# Patient Record
Sex: FEMALE | Race: BLACK OR AFRICAN AMERICAN | Employment: FULL TIME | URBAN - METROPOLITAN AREA
[De-identification: names, ages, dates, MRNs, and addresses within clinical notes are randomized per-mention and may not be internally consistent; named-entity substitution may affect disease eponyms.]

---
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
## 2019-02-21 ENCOUNTER — OFFICE VISIT - HEALTHEAST (OUTPATIENT)
Dept: FAMILY MEDICINE | Facility: CLINIC | Age: 19
End: 2019-02-21

## 2019-02-21 ENCOUNTER — COMMUNICATION - HEALTHEAST (OUTPATIENT)
Dept: FAMILY MEDICINE | Facility: CLINIC | Age: 19
End: 2019-02-21

## 2019-02-21 DIAGNOSIS — B96.89 BV (BACTERIAL VAGINOSIS): ICD-10-CM

## 2019-02-21 DIAGNOSIS — N89.8 VAGINAL ITCHING: ICD-10-CM

## 2019-02-21 DIAGNOSIS — N76.0 BV (BACTERIAL VAGINOSIS): ICD-10-CM

## 2019-02-21 DIAGNOSIS — B35.4 TINEA CORPORIS: ICD-10-CM

## 2019-02-21 DIAGNOSIS — R79.89 LOW VITAMIN D LEVEL: ICD-10-CM

## 2019-02-21 DIAGNOSIS — Z11.3 SCREENING FOR STD (SEXUALLY TRANSMITTED DISEASE): ICD-10-CM

## 2019-02-21 DIAGNOSIS — Z00.00 WELL FEMALE EXAM WITHOUT GYNECOLOGICAL EXAM: ICD-10-CM

## 2019-02-21 LAB
ALBUMIN SERPL-MCNC: 4.3 G/DL (ref 3.5–5)
ALP SERPL-CCNC: 61 U/L (ref 45–120)
ALT SERPL W P-5'-P-CCNC: 27 U/L (ref 0–45)
ANION GAP SERPL CALCULATED.3IONS-SCNC: 8 MMOL/L (ref 5–18)
AST SERPL W P-5'-P-CCNC: 27 U/L (ref 0–40)
BILIRUB SERPL-MCNC: 0.7 MG/DL (ref 0–1)
BUN SERPL-MCNC: 10 MG/DL (ref 8–22)
CALCIUM SERPL-MCNC: 10 MG/DL (ref 8.5–10.5)
CHLORIDE BLD-SCNC: 103 MMOL/L (ref 98–107)
CHOLEST SERPL-MCNC: 168 MG/DL
CLUE CELLS: ABNORMAL
CO2 SERPL-SCNC: 27 MMOL/L (ref 22–31)
CREAT SERPL-MCNC: 0.82 MG/DL (ref 0.6–1.1)
ERYTHROCYTE [DISTWIDTH] IN BLOOD BY AUTOMATED COUNT: 11.5 % (ref 11–14.5)
FASTING STATUS PATIENT QL REPORTED: NO
GFR SERPL CREATININE-BSD FRML MDRD: >60 ML/MIN/1.73M2
GLUCOSE BLD-MCNC: 75 MG/DL (ref 70–125)
HCT VFR BLD AUTO: 44.3 % (ref 35–47)
HDLC SERPL-MCNC: 58 MG/DL
HGB BLD-MCNC: 15.1 G/DL (ref 12–16)
HIV 1+2 AB+HIV1 P24 AG SERPL QL IA: NEGATIVE
LDLC SERPL CALC-MCNC: 96 MG/DL
MCH RBC QN AUTO: 30.2 PG (ref 27–34)
MCHC RBC AUTO-ENTMCNC: 34 G/DL (ref 32–36)
MCV RBC AUTO: 89 FL (ref 80–100)
PLATELET # BLD AUTO: 268 THOU/UL (ref 140–440)
PMV BLD AUTO: 7.4 FL (ref 7–10)
POTASSIUM BLD-SCNC: 4.1 MMOL/L (ref 3.5–5)
PROT SERPL-MCNC: 7 G/DL (ref 6–8)
RBC # BLD AUTO: 5 MILL/UL (ref 3.8–5.4)
SODIUM SERPL-SCNC: 138 MMOL/L (ref 136–145)
TRICHOMONAS, WET PREP: ABNORMAL
TRIGL SERPL-MCNC: 69 MG/DL
TSH SERPL DL<=0.005 MIU/L-ACNC: 0.55 UIU/ML (ref 0.3–5)
WBC: 4.9 THOU/UL (ref 4–11)
YEAST, WET PREP: ABNORMAL

## 2019-02-21 ASSESSMENT — MIFFLIN-ST. JEOR: SCORE: 1654.36

## 2019-02-22 ENCOUNTER — COMMUNICATION - HEALTHEAST (OUTPATIENT)
Dept: INTERNAL MEDICINE | Facility: CLINIC | Age: 19
End: 2019-02-22

## 2019-02-22 ENCOUNTER — COMMUNICATION - HEALTHEAST (OUTPATIENT)
Dept: FAMILY MEDICINE | Facility: CLINIC | Age: 19
End: 2019-02-22

## 2019-02-22 LAB
25(OH)D3 SERPL-MCNC: 10.4 NG/ML (ref 30–80)
C TRACH DNA SPEC QL PROBE+SIG AMP: NEGATIVE
HSV1 IGG SERPL QL IA: POSITIVE
HSV2 IGG SERPL QL IA: POSITIVE
N GONORRHOEA DNA SPEC QL NAA+PROBE: NEGATIVE
T PALLIDUM AB SER QL: NEGATIVE

## 2019-02-26 ENCOUNTER — COMMUNICATION - HEALTHEAST (OUTPATIENT)
Dept: SCHEDULING | Facility: CLINIC | Age: 19
End: 2019-02-26

## 2019-02-27 ENCOUNTER — COMMUNICATION - HEALTHEAST (OUTPATIENT)
Dept: FAMILY MEDICINE | Facility: CLINIC | Age: 19
End: 2019-02-27

## 2019-10-16 ENCOUNTER — OFFICE VISIT - HEALTHEAST (OUTPATIENT)
Dept: FAMILY MEDICINE | Facility: CLINIC | Age: 19
End: 2019-10-16

## 2019-10-16 DIAGNOSIS — R07.0 THROAT PAIN: ICD-10-CM

## 2019-10-16 DIAGNOSIS — Z11.3 SCREEN FOR STD (SEXUALLY TRANSMITTED DISEASE): ICD-10-CM

## 2019-10-16 LAB — DEPRECATED S PYO AG THROAT QL EIA: NORMAL

## 2019-10-17 ENCOUNTER — AMBULATORY - HEALTHEAST (OUTPATIENT)
Dept: FAMILY MEDICINE | Facility: CLINIC | Age: 19
End: 2019-10-17

## 2019-10-17 DIAGNOSIS — A54.9 GONORRHEA: ICD-10-CM

## 2019-10-17 LAB
C TRACH DNA SPEC QL PROBE+SIG AMP: NEGATIVE
GROUP A STREP BY PCR: NORMAL
N GONORRHOEA DNA SPEC QL NAA+PROBE: POSITIVE

## 2019-10-18 LAB — BACTERIA SPEC CULT: NORMAL

## 2020-09-25 ENCOUNTER — COMMUNICATION - HEALTHEAST (OUTPATIENT)
Dept: SCHEDULING | Facility: CLINIC | Age: 20
End: 2020-09-25

## 2020-09-26 ENCOUNTER — OFFICE VISIT (OUTPATIENT)
Dept: URGENT CARE | Facility: URGENT CARE | Age: 20
End: 2020-09-26
Payer: COMMERCIAL

## 2020-09-26 VITALS
SYSTOLIC BLOOD PRESSURE: 128 MMHG | OXYGEN SATURATION: 100 % | TEMPERATURE: 98 F | DIASTOLIC BLOOD PRESSURE: 84 MMHG | WEIGHT: 175 LBS | HEART RATE: 61 BPM

## 2020-09-26 DIAGNOSIS — T78.40XA ALLERGIC REACTION, INITIAL ENCOUNTER: Primary | ICD-10-CM

## 2020-09-26 PROCEDURE — 99203 OFFICE O/P NEW LOW 30 MIN: CPT | Performed by: NURSE PRACTITIONER

## 2020-09-26 RX ORDER — CETIRIZINE HYDROCHLORIDE 10 MG/1
10 TABLET ORAL DAILY
Qty: 30 TABLET | Refills: 0 | Status: SHIPPED | OUTPATIENT
Start: 2020-09-26 | End: 2020-10-26

## 2020-09-26 RX ORDER — PREDNISONE 20 MG/1
40 TABLET ORAL DAILY
Qty: 10 TABLET | Refills: 0 | Status: SHIPPED | OUTPATIENT
Start: 2020-09-26 | End: 2020-10-01

## 2020-09-26 RX ORDER — ACYCLOVIR 200 MG/1
200 CAPSULE ORAL
COMMUNITY

## 2020-09-26 RX ORDER — DIPHENHYDRAMINE HCL 25 MG
25 CAPSULE ORAL EVERY 6 HOURS PRN
COMMUNITY

## 2020-09-26 NOTE — PATIENT INSTRUCTIONS
Patient Education     Understanding Hives (Urticaria)    Urticaria (hives) are red, itchy, and swollen areas on the skin. They are most often an allergic reaction from eating a food or taking a medicine. Sometimes the cause may be unknown. A single hive can vary in size from a half inch to several inches. Hives can appear all over the body. Or they may appear on only one part of the body.  What causes hives?  Hives can be caused by food and beverages such as:    Tree nuts (almonds, walnuts, hazelnuts)    Peanuts    Eggs    Shellfish    Milk  Hives can also be caused by medicines such as:    Antibiotics, especially penicillin and sulfa-based medicines     Anticonvulsant or antiseizure medicines     Chemotherapy medicines   Other causes of hives include:    Infection or virus    Heat    Cold air or cold water    Exercise    Scratching or rubbing your skin, or wearing tight-fitting clothes that rub your skin    Being exposed to sunlight or light from a light bulb, in rare cases    Inhaled-chemicals in the environment from foods and drugs, insects, plants, or other sources  In some cases, hives may occur again and again with no specific cause.  If you have hives    Stay away from the food, drink, medicine, or other thing that may be causing the hives.    Ask your healthcare provider how to control itchy or irritated skin.    Talk with your healthcare provider right away if you think a medicine gave you hives.  Watch for anaphylaxis  If you have hives, watch for symptoms of a severe reaction that can affect your entire body. This is called anaphylaxis. Symptoms can include swollen areas of the body, wheezing, trouble breathing or swallowing, and a hoarse voice. This reaction may happen right away. Or it may happen in an hour or more. In extreme cases, the airways from mouth to lungs may swell and make breathing difficult. This is a medical emergency. Use epinephrine medicine if you have it, and call 911 or go to the  emergency room.     When to call your healthcare provider  Call your healthcare provider if:    Your hives feel uncomfortable    You have never had hives before    Your symptoms don't go away or come back    Your symptoms get worse or new symptoms develop such as:   ? Sneezing, coughing, runny or stuffy nose  ? Itching of the eyes, nose, or roof of the mouth  ? Itching, burning, stinging, or pain  ? Dry, flaky, cracking, or scaly skin  ? Red or purple spots  Call 911  Call 911 right away if you have:    Swelling in your lips, tongue, or throat, called angioedema    Drooling    Trouble breathing, talking, or swallowing    Cool, moist or pale (blue in color) skin    Fast and weak heartbeat    Wheezing or short of breath    Feeling lightheaded or confused    Diarrhea    Severe nausea or vomiting    Seizure    Feeling dizzy or weak, or a sudden drop in blood pressure  Date Last Reviewed: 4/1/2017 2000-2019 [employer]. [address] [place] [address], [place] [address]. All rights reserved. This information is not intended as a substitute for professional medical care. Always follow your healthcare professional's instructions.

## 2020-09-26 NOTE — PROGRESS NOTES
SUBJECTIVE:   Neto Reyes is a 19 year old female presenting with a chief complaint of hives intermittently for 3 days. She ate pecans 24 hours before the outbreak.    Onset of symptoms was 3 day(s) ago.  Course of illness is same    Severity moderate  Previous Treatment benadryl      History reviewed. No pertinent past medical history.  Current Outpatient Medications   Medication Sig Dispense Refill     acyclovir (ZOVIRAX) 200 MG capsule Take 200 mg by mouth 5 times daily       cetirizine (ZYRTEC) 10 MG tablet Take 1 tablet (10 mg) by mouth daily 30 tablet 0     diphenhydrAMINE (BENADRYL) 25 MG capsule Take 25 mg by mouth every 6 hours as needed for itching or allergies       predniSONE (DELTASONE) 20 MG tablet Take 2 tablets (40 mg) by mouth daily for 5 days 10 tablet 0     Social History     Tobacco Use     Smoking status: Never Smoker     Smokeless tobacco: Never Used   Substance Use Topics     Alcohol use: Not on file     History reviewed. No pertinent family history.     ROS: 10 point ROS neg other than the symptoms noted above in the HPI.     OBJECTIVE  /84   Pulse 61   Temp 98  F (36.7  C) (Tympanic)   Wt 79.4 kg (175 lb)   SpO2 100%       GENERAL APPEARANCE: healthy appearing, alert     EYES: PERRL, EOMI, sclera non-icteric     HENT: oral exam benign, mucus membranes intact, without ulcers or lesions     NECK: no adenopathy or asymmetry, thyroid normal to palpation     RESP: lungs clear to auscultation - no rales, rhonchi or wheezes     CV: regular rates and rhythm, no murmurs, rubs, or gallop     SKIN: hives scattered all over body      ASSESSMENT/PLAN:      ICD-10-CM    1. Allergic reaction, initial encounter  T78.40XA predniSONE (DELTASONE) 20 MG tablet     cetirizine (ZYRTEC) 10 MG tablet        Follow Up:      Patient Instructions     Patient Education     Understanding Hives (Urticaria)    Urticaria (hives) are red, itchy, and swollen areas on the skin. They are most often an allergic  reaction from eating a food or taking a medicine. Sometimes the cause may be unknown. A single hive can vary in size from a half inch to several inches. Hives can appear all over the body. Or they may appear on only one part of the body.  What causes hives?  Hives can be caused by food and beverages such as:    Tree nuts (almonds, walnuts, hazelnuts)    Peanuts    Eggs    Shellfish    Milk  Hives can also be caused by medicines such as:    Antibiotics, especially penicillin and sulfa-based medicines     Anticonvulsant or antiseizure medicines     Chemotherapy medicines   Other causes of hives include:    Infection or virus    Heat    Cold air or cold water    Exercise    Scratching or rubbing your skin, or wearing tight-fitting clothes that rub your skin    Being exposed to sunlight or light from a light bulb, in rare cases    Inhaled-chemicals in the environment from foods and drugs, insects, plants, or other sources  In some cases, hives may occur again and again with no specific cause.  If you have hives    Stay away from the food, drink, medicine, or other thing that may be causing the hives.    Ask your healthcare provider how to control itchy or irritated skin.    Talk with your healthcare provider right away if you think a medicine gave you hives.  Watch for anaphylaxis  If you have hives, watch for symptoms of a severe reaction that can affect your entire body. This is called anaphylaxis. Symptoms can include swollen areas of the body, wheezing, trouble breathing or swallowing, and a hoarse voice. This reaction may happen right away. Or it may happen in an hour or more. In extreme cases, the airways from mouth to lungs may swell and make breathing difficult. This is a medical emergency. Use epinephrine medicine if you have it, and call 911 or go to the emergency room.     When to call your healthcare provider  Call your healthcare provider if:    Your hives feel uncomfortable    You have never had hives  before    Your symptoms don't go away or come back    Your symptoms get worse or new symptoms develop such as:   ? Sneezing, coughing, runny or stuffy nose  ? Itching of the eyes, nose, or roof of the mouth  ? Itching, burning, stinging, or pain  ? Dry, flaky, cracking, or scaly skin  ? Red or purple spots  Call 911  Call 911 right away if you have:    Swelling in your lips, tongue, or throat, called angioedema    Drooling    Trouble breathing, talking, or swallowing    Cool, moist or pale (blue in color) skin    Fast and weak heartbeat    Wheezing or short of breath    Feeling lightheaded or confused    Diarrhea    Severe nausea or vomiting    Seizure    Feeling dizzy or weak, or a sudden drop in blood pressure  Date Last Reviewed: 4/1/2017 2000-2019 [employer]. [address] [place] [address]. All rights reserved. This information is not intended as a substitute for professional medical care. Always follow your healthcare professional's instructions.               ADAMS Mercado, CNP  [facility] Provider

## 2021-06-02 VITALS — HEIGHT: 63 IN | BODY MASS INDEX: 35.79 KG/M2 | WEIGHT: 202 LBS

## 2021-06-03 VITALS
SYSTOLIC BLOOD PRESSURE: 123 MMHG | BODY MASS INDEX: 31.3 KG/M2 | DIASTOLIC BLOOD PRESSURE: 75 MMHG | RESPIRATION RATE: 12 BRPM | HEART RATE: 70 BPM | OXYGEN SATURATION: 100 % | TEMPERATURE: 98 F | WEIGHT: 178.1 LBS

## 2021-06-11 NOTE — TELEPHONE ENCOUNTER
Triage call:   Couple days ago had breakfast had pecans- states she has not had issues with Pecans in the past- about 24 hours later she started to get hives- took benadryl - has been consistently having to take benadryl over the last 48 hours  Widespread hives- very itchy   no trouble breathing or scratchy throat    COVID 19 Nurse Triage Plan/Patient Instructions    Please be aware that novel coronavirus (COVID-19) may be circulating in the community. If you develop symptoms such as fever, cough, or SOB or if you have concerns about the presence of another infection including coronavirus (COVID-19), please contact your health care provider or visit [email or web address].     Disposition/Instructions    In-Person Visit with provider recommended. Reference Visit Selection Guide. Will go to [facility] today- states she lives in [place] now.     Thank you for taking steps to prevent the spread of this virus.  o Limit your contact with others.  o Wear a simple mask to cover your cough.  o Wash your hands well and often.    Resources    [facility]: About COVID-19: [email or web address]    CDC: What to Do If You're Sick: www.cdc.gov/coronavirus/2019-ncov/about/steps-when-sick.html    CDC: Ending Home Isolation: www.cdc.gov/coronavirus/2019-ncov/hcp/disposition-in-home-patients.html     CDC: Caring for Someone: www.cdc.gov/coronavirus/2019-ncov/if-you-are-sick/care-for-someone.html     [facility]: Interim Guidance for Hospital Discharge to Home: www.health.[facility].[place].us/diseases/coronavirus/hcp/hospdischarge.pdf    [facility] clinical trials (COVID-19 research studies): clinicalaffairs.[facility]/um-clinical-trials     Below are the COVID-19 hotlines at the [place] Department of [facility] ([facility]). Interpreters are available.   o For health questions: Call 698-145-5679 or 1-398.863.8535 (7 a.m. to 7 p.m.)  o For questions about schools and childcare: Call 310-939-9434 or 1-143.885.2962 (7 a.m.  to 7 p.m.)     Catie Harrison RN BSBA [facility] Triage/Med Refill 9/25/2020 1:48 PM  Reason for Disposition    MODERATE-SEVERE hives persist (i.e., hives interfere with normal activities or work) and taking antihistamine (e.g., Benadryl, Claritin) > 24 hours    Additional Information    Negative: Difficulty breathing or wheezing now    Negative: Rapid onset of swollen tongue    Negative: Rapid onset of hoarseness or cough    Negative: Very weak (can't stand)    Negative: Difficult to awaken or acting confused (e.g., disoriented, slurred speech)    Negative: Life-threatening reaction (anaphylaxis) in the past to similar substance (e.g., food, insect bite/sting, chemical, etc.) and < 2 hours since exposure    Negative: Sounds like a life-threatening emergency to the triager    Negative: Bee, wasp, or yellow jacket sting within last 24 hours    Negative: Taking a new medicine now or within last 3 days (EXCEPTION: antihistamine, decongestant or other OTC cough/cold medicines)    Negative: Doesn't match the SYMPTOMS of hives    Negative: Swollen tongue    Negative: Widespread hives and onset < 2 hours of exposure to high-risk allergen (e.g., peanuts, tree nuts, fish, or shellfish)    Negative: Patient sounds very sick or weak to the triager    Protocols used: HIVES-A-OH

## 2021-06-17 NOTE — PATIENT INSTRUCTIONS - HE
Patient Instructions by Paul Quiroz PA-C at 10/16/2019  7:10 AM     Author: Paul Quiroz PA-C Service: -- Author Type: Physician Assistant    Filed: 10/16/2019  7:57 AM Encounter Date: 10/16/2019 Status: Signed    : Paul Quiroz PA-C (Physician Assistant)       Rapid Strep test was negative.     If overnight test returns positive, we will call tomorrow and call in antibiotic prescription.    No notification means that overnight test returned negative.    Symptoms are likely due to viral infection that will resolve on its own in 1-2 weeks.    May continue with symptomatic treatments including:  - Salt water gargles  - Warm beverages such as a non-caffeinated tea with honey  - Throat drops  - Ibuprofen or acetaminophen for pain or fever relief    If fevers not coming down with acetaminophen or ibuprofen, shortness of breath, not tolerating oral liquid intake, drooling, or stiff neck, return for re-evaluation immediately. Otherwise, if no improvement in the next week, follow up with your primary care provider.    Patient Education     If You Think You Have an STI (STD)  Treating a sexually transmitted infection (STI),  early limits the problems they can cause and helps prevent its spread to others. An STI is also known as a sexually transmitted disease (STD). If you have an STI, get treated right away. Ask your partner to be tested, too. Then avoid sex until youve finished treatment and your healthcare provider says its OK to have sex again.  Follow your treatment plan  Treatment depends on the type of STI you have. Common treatments include injections and oral pills or liquids. Creams and gels can be applied to sores or warts caused by certain STIs. Follow the tips below:    Get new treatment for each new STI.    Dont use old medicine, even for the same STI. Use medicines as directed.    Dont share medicine unless instructed to do so by your healthcare provider or clinic.  Talk to your  partner  If you have an STI, its your duty to tell all your recent partners so they can be tested and treated. This is one important way to prevent the disease from being spread. Telling a partner that you have an STI can be hard. You may be embarrassed, angry, or afraid. Its often unclear who had the STI first. So try not to place blame. Your healthcare provider may offer some advice on how to start.  Prevent future problems  Even after youve been treated, you can still be infected again. This is a common problem. It can happen if a partner passes the STI back to you. To prevent this, your partner or partners must be tested. He or she may also need treatment. After treatment, go to any scheduled follow-up visits. Then prevent future problems with safer sex. Limit your number of partners and always use a latex condom.  Diagnosing STIs  Your healthcare provider will take a health history and examine you. During your health history, you will be asked about your sex habits and health history. You may also be asked about drug use. Give honest answers. Your healthcare provider will then check your body for signs of STIs. He or she also may do one or more of the following tests:    Fluid may be swabbed from sores. Samples also may be taken from the vagina, penis, mouth, or rectum. The samples are then tested for STIs.    Blood or urine samples may be taken. They are checked for viruses or bacteria that cause STIs.    For women, cells from the cervix are checked for signs of cancer and the genital wart virus (human papillomavirus, or HPV). This is called a Pap smear, and is often now done along with HPV testing. If cell changes are found, or a high-risk type of HPV is found, a magnifying scope may be used to take a closer look (colposcopy). In men and women, a Pap smear may be done on the anus to check for HPV-linked cancer or precancerous changes. This is done by a healthcare provider gently swabbing cells from the lining  of the anus, and then sending the sample to be looked at in the lab. If there are signs of abnormality, you may need more testing.  Date Last Reviewed: 2/1/2019 2000-2019 [employer]. [address] [place] [address], [place] [address]. All rights reserved. This information is not intended as a substitute for professional medical care. Always follow your healthcare professional's instructions.

## 2021-06-18 NOTE — LETTER
Letter by Kriss Melo PA-C at      Author: Kriss Melo PA-C Service: -- Author Type: --    Filed:  Encounter Date: 2/22/2019 Status: (Other)       [facility]  [address]  [place] [address]             February 22, 2019       Dear MsShireen MaderaReyes,    Below are the results from your recent visit:    Order was sent to  Pharmacy.  Positive for bacterial vaginosis, not a STD, nightly treatment for 5 nights and should resolve.     Resulted Orders   Wet Prep, Vaginal   Result Value Ref Range    Yeast Result No yeast seen No yeast seen    Trichomonas No Trichomonas seen No Trichomonas seen    Clue Cells, Wet Prep Clue cells seen (!) No Clue cells seen   HM2(CBC w/o Differential)   Result Value Ref Range    WBC 4.9 4.0 - 11.0 thou/uL    RBC 5.00 3.80 - 5.40 mill/uL    Hemoglobin 15.1 12.0 - 16.0 g/dL    Hematocrit 44.3 35.0 - 47.0 %    MCV 89 80 - 100 fL    MCH 30.2 27.0 - 34.0 pg    MCHC 34.0 32.0 - 36.0 g/dL    RDW 11.5 11.0 - 14.5 %    Platelets 268 140 - 440 thou/uL    MPV 7.4 7.0 - 10.0 fL         Please call with questions or contact us using [employer]    Sincerely,        Electronically signed by Kriss Melo PA-C

## 2021-06-24 NOTE — PROGRESS NOTES
SUBJECTIVE:  Kimi Reyes is a 18 y.o. female who is here for an annual physical.  Kimi Reyes is seen for her yearly physical, also is concerned about getting full STI testing.  She notes some increased vaginal secretions lately with no pelvic pain, dyspareunia, dysuria, vaginal rashes.  She does have some itching.  She has one sexual partner, they both did STI testing at the beginning of the relationship.  They are monogamous.  She also has a history of eczema, uses Hydroucerin cream which usually takes care of her eczema well.  She notes now that she has several little round patches in various areas on her skin, right forearm extensor surface, right abdomen area, left neck area, left inner thigh area.  All of these patches are less than 1-1-1/2 cm in diameter, flat, dry, hyperpigmented.  ROS: Patient denies fever, chills, sweats, fainting, fatigue, weight change, dizziness, sleep problems, chest pain, palpitations, shortness of breath, wheezing, cough,  sore throat, changes in hearing, ear pain,tinnitus,  disphagia, sore throat, globus, changes in vision, eye pain eye redness, acid reflux, nausea, vomiting, diarrhea, constipation, black or bloody stools,  Dysuria, frequency, urinary incontinence, nocturia, hematuria, back pain,joint pain, bone pain, muscle cramps,edema, weakness, numbness, tingling of extremities, rash, itching, skin changes, swollen lymph nodes, thirst, increased urination, breast lumps, breast pain, nipple discharge, memory difficulties, anxiety, mood swings, (female)vaginal discharge, dyspareunia, menorrhagia, pelvic pain, sexual dysfunction,   (male) testicular lumps, erectile dysfunction.    No past medical history on file.  Current Outpatient Medications on File Prior to Visit   Medication Sig Dispense Refill     etonogestrel (NEXPLANON) 68 mg Impl implant 1 each by Subdermal route once.       No current facility-administered medications on file prior to visit.   "    Social History     Socioeconomic History     Marital status: Single     Spouse name: Not on file     Number of children: Not on file     Years of education: Not on file     Highest education level: Not on file   Occupational History     Not on file   Social Needs     Financial resource strain: Not on file     Food insecurity:     Worry: Not on file     Inability: Not on file     Transportation needs:     Medical: Not on file     Non-medical: Not on file   Tobacco Use     Smoking status: Never Smoker     Smokeless tobacco: Never Used   Substance and Sexual Activity     Alcohol use: No     Frequency: Never     Drug use: No     Sexual activity: Yes     Birth control/protection: Implant   Lifestyle     Physical activity:     Days per week: Not on file     Minutes per session: Not on file     Stress: Not on file   Relationships     Social connections:     Talks on phone: Not on file     Gets together: Not on file     Attends Synagogue service: Not on file     Active member of club or organization: Not on file     Attends meetings of clubs or organizations: Not on file     Relationship status: Not on file     Intimate partner violence:     Fear of current or ex partner: Not on file     Emotionally abused: Not on file     Physically abused: Not on file     Forced sexual activity: Not on file   Other Topics Concern     Not on file   Social History Narrative     Not on file     Social History     Social History Narrative     Not on file     No past surgical history on file.  No family history on file.  /74   Pulse 72   Ht 5' 3.25\" (1.607 m)   Wt 202 lb (91.6 kg)   LMP  (LMP Unknown)   SpO2 99%   BMI 35.50 kg/m    Objective:  General: alert, oriented and in no distress.  HEENT: Sclera white, PERRLA, nares patent, MMM.  Heart: Heart has a regular rate and rhythm, no murmurs, clicks or rubs.  Lungs: Respirations are not labored. Equal chest rise, clear to auscultation, without rales, rhonchi or " wheezes.  Abdomen: BS in 4 quadrants, no tenderness to light or deep palpation, liver and spleen are not palpably enlarged and there are no masses noted.   Back: Normal curvatures, no tenderness of spinous processes or musculature with palpation of cervical, thoracic or lumbar back. No CVA tenderness bilaterally.  : normal appearing female with no external labial rash, no vaginal rash, moderate white exudates and normal appearing cervix. No tenderness to bimanual exam and no palpable masses.  Neuro: CN 2-12 intact, equal M/S, normal gait.  Skin: 1-1-1/2 cm dry patches of pigmented skin on right extensor forearm, right anterior chest, left inner thigh.  No erythema, no raised edge, no pustules or papules.  Mood: good eye contact, affect full, no pressured speech, no suicidal ideations.  Breasts: Normal appearing, no nipple discharge or skin changes bilaterally, no palpable masses or tenderness, no axillary adenopathy bilaterally.    Assessment and Plan:  1. Vaginal itching  Wet Prep, Vaginal   2. Screening for STD (sexually transmitted disease)  HIV Antigen/Antibody Screening [place]    Herpes simplex Virus (Type 1 and 2) IgG Antibody    Syphilis Screen, [facility]    Chlamydia trachomatis & Neisseria gonorrhoeae, Amplified Detection    HIV Antigen/Antibody Screening [place]   3. Well female exam without gynecological exam  HM2(CBC w/o Differential)    Lipid [facility]    Thyroid [place]    Comprehensive Metabolic Panel    Vitamin D, Total (25-Hydroxy)    HM2(CBC w/o Differential)    Lipid [place]    Thyroid [place]    Comprehensive Metabolic Panel    Vitamin D, Total (25-Hydroxy)   4. Tinea corporis  terbinafine HCl (LAMISIL) 1 % cream   5. BV (bacterial vaginosis)  metroNIDAZOLE (METROGEL VAGINAL) 0.75 % vaginal gel       Advised health diet, 6-8 fruit and vegetables daily and discussed juicing in am with protein powder for breakfast.  Advised regular exercise, discussed regular aerobic exercise and strength  training.  Advised not more than 1 alcoholic drinks in any given day.  Caffeine: not more than 2 daily.  Water: 6-8 glasses daily.

## 2021-06-24 NOTE — TELEPHONE ENCOUNTER
Left message to call back. Result was later than others and yes it is positive. Will discuss the results at phone conversation.

## 2021-06-24 NOTE — PATIENT INSTRUCTIONS - HE
Advised health diet, 6-8 fruit and vegetables daily and discussed juicing in am with protein powder for breakfast.  Advised regular exercise, discussed regular aerobic exercise and strength training.  Advised not more than 1 alcoholic drinks in any given day.  Caffeine: not more than 2 daily.  Water: 6-8 glasses daily.  Multivitamin with iron daily

## 2021-06-24 NOTE — TELEPHONE ENCOUNTER
Test result questions.     What does the positive Herpes test mean?  -Education for STI discussed with patient    What prescriptions should she be taking?  -ergocalciferol for Vit D  -metronidazole vaginal gel  -terbinafine cream  --All discussed with patient.     When should she follow up?  -did not see documentation of recommended follow up  -provider please advise.     Reason for Disposition    [1] Follow-up call from patient regarding patient's clinical status AND [2] information NON-URGENT    Protocols used: PCP CALL - NO TRIAGE-A-    Norah Bruno RN Triage Nurse Advisor [facility]

## 2021-06-24 NOTE — TELEPHONE ENCOUNTER
Patient Returning Call  Reason for call:  Patient returned call   Information relayed to patient:    Patient given the information as instructed Notes recorded by Kriss Melo PA-C on 2/21/2019 at 1:30 PM CST  Called to patient and order was sent to her pharmacy, had to leave a message so she may call for results; positive for bacterial vaginosis, not a STD, nightly treatment for 5 nights and should resolve.  Patient has additional questions:  No  If YES, what are your questions/concerns:  none  Okay to leave a detailed message?: Yes

## 2021-06-28 NOTE — PROGRESS NOTES
Progress Notes by Paul Quiroz PA-C at 10/16/2019  7:10 AM     Author: Paul Quiroz PA-C Service: -- Author Type: Physician Assistant    Filed: 10/16/2019  8:28 AM Encounter Date: 10/16/2019 Status: Signed    : Paul Quiroz PA-C (Physician Assistant)         Assessment:       1. Throat pain  Rapid Strep A Screen-Throat swab    Group A Strep, RNA Direct Detection, Throat   2. Screen for STD (sexually transmitted disease)  Chlamydia trachomatis & Neisseria gonorrhoeae, Amplified Detection    Culture, Throat     Medical Decision Making  Patient presents with throat pain most likely a viral URI, but may also be a gonorrhea pharyngitis patiently recently had oral sex exposure with a new contact.  Will obtain a throat culture at this time.  The rapid strep test today is negative.  Also obtained the dirty urine sample to test for gonorrhea and chlamydia.  Patient will continue to wait until her other STD screening tests come back from [facility].    25 minutes spent with the patient with greater than 50% of face-to-face time spent counseling and/or coordination of care.      Plan:       Over-the-counter analgesics discussed.  Salt water gargles, throat lozenges, warm tea with honey as needed.  Abstain from sex until STD screening tests have returned.  Discussed wearing protection.  Discussed signs of worsening symptoms and when to follow-up with PCP if no symptom improvement.      Patient Instructions   Rapid Strep test was negative.     If overnight test returns positive, we will call tomorrow and call in antibiotic prescription.    No notification means that overnight test returned negative.    Symptoms are likely due to viral infection that will resolve on its own in 1-2 weeks.    May continue with symptomatic treatments including:  - Salt water gargles  - Warm beverages such as a non-caffeinated tea with honey  - Throat drops  - Ibuprofen or acetaminophen for pain or fever  relief    If fevers not coming down with acetaminophen or ibuprofen, shortness of breath, not tolerating oral liquid intake, drooling, or stiff neck, return for re-evaluation immediately. Otherwise, if no improvement in the next week, follow up with your primary care provider.    Patient Education     If You Think You Have an STI (STD)  Treating a sexually transmitted infection (STI),  early limits the problems they can cause and helps prevent its spread to others. An STI is also known as a sexually transmitted disease (STD). If you have an STI, get treated right away. Ask your partner to be tested, too. Then avoid sex until youve finished treatment and your healthcare provider says its OK to have sex again.  Follow your treatment plan  Treatment depends on the type of STI you have. Common treatments include injections and oral pills or liquids. Creams and gels can be applied to sores or warts caused by certain STIs. Follow the tips below:    Get new treatment for each new STI.    Dont use old medicine, even for the same STI. Use medicines as directed.    Dont share medicine unless instructed to do so by your healthcare provider or clinic.  Talk to your partner  If you have an STI, its your duty to tell all your recent partners so they can be tested and treated. This is one important way to prevent the disease from being spread. Telling a partner that you have an STI can be hard. You may be embarrassed, angry, or afraid. Its often unclear who had the STI first. So try not to place blame. Your healthcare provider may offer some advice on how to start.  Prevent future problems  Even after youve been treated, you can still be infected again. This is a common problem. It can happen if a partner passes the STI back to you. To prevent this, your partner or partners must be tested. He or she may also need treatment. After treatment, go to any scheduled follow-up visits. Then prevent future problems with safer sex. Limit  your number of partners and always use a latex condom.  Diagnosing STIs  Your healthcare provider will take a health history and examine you. During your health history, you will be asked about your sex habits and health history. You may also be asked about drug use. Give honest answers. Your healthcare provider will then check your body for signs of STIs. He or she also may do one or more of the following tests:    Fluid may be swabbed from sores. Samples also may be taken from the vagina, penis, mouth, or rectum. The samples are then tested for STIs.    Blood or urine samples may be taken. They are checked for viruses or bacteria that cause STIs.    For women, cells from the cervix are checked for signs of cancer and the genital wart virus (human papillomavirus, or HPV). This is called a Pap smear, and is often now done along with HPV testing. If cell changes are found, or a high-risk type of HPV is found, a magnifying scope may be used to take a closer look (colposcopy). In men and women, a Pap smear may be done on the anus to check for HPV-linked cancer or precancerous changes. This is done by a healthcare provider gently swabbing cells from the lining of the anus, and then sending the sample to be looked at in the lab. If there are signs of abnormality, you may need more testing.  Date Last Reviewed: 2/1/2019 2000-2019 [employer]. [address] [place], [place] [id number]. All rights reserved. This information is not intended as a substitute for professional medical care. Always follow your healthcare professional's instructions.             Subjective:       Kimi Reyes is a 19 y.o. female here for evaluation of multiple complaints including throat pain and wanting additional STD screening.  Onset of throat irritation began 1 to 2 weeks ago.  Patient notes pain with swallowing and while crying.  Associated symptoms include some mild ear irritation worse on the and a dry cough.   "Patient denies fevers.  She is not taking any medications to help with her symptoms.  Patient states that she recently has had unprotected sex.  She went to [facility] where they performed a full STD screening and pregnancy testing, but wants testing to be done here as it comes back \"quicker\".  She was started on metronidazole and Diflucan for bacterial vaginosis and yeast infection respectively.    The following portions of the patient's history were reviewed and updated as appropriate: allergies, current medications and problem list.    Review of Systems  Pertinent items are noted in HPI.     Allergies  No Known Allergies    No family history on file.    Social History     Socioeconomic History   ? Marital status: Single     Spouse name: None   ? Number of children: None   ? Years of education: None   ? Highest education level: None   Occupational History   ? None   Social Needs   ? Financial resource strain: None   ? Food insecurity:     Worry: None     Inability: None   ? Transportation needs:     Medical: None     Non-medical: None   Tobacco Use   ? Smoking status: Never Smoker   ? Smokeless tobacco: Never Used   Substance and Sexual Activity   ? Alcohol use: No     Frequency: Never   ? Drug use: No   ? Sexual activity: Yes     Birth control/protection: Implant   Lifestyle   ? Physical activity:     Days per week: None     Minutes per session: None   ? Stress: None   Relationships   ? Social connections:     Talks on phone: None     Gets together: None     Attends Faith service: None     Active member of club or organization: None     Attends meetings of clubs or organizations: None     Relationship status: None   ? Intimate partner violence:     Fear of current or ex partner: None     Emotionally abused: None     Physically abused: None     Forced sexual activity: None   Other Topics Concern   ? None   Social History Narrative   ? None         Objective:       /75   Pulse 70   Temp 98  F " (36.7  C) (Oral)   Resp 12   Wt 178 lb 1.6 oz (80.8 kg)   LMP 10/02/2019 (LMP Unknown)   SpO2 100%   Breastfeeding? No   BMI 31.30 kg/m    General appearance: alert, appears stated age, cooperative, no distress and non-toxic  Head: Normocephalic, without obvious abnormality, atraumatic  Ears: TMs intact with mucoid fluid and bulging, no erythema  Nose: no discharge  Throat: Mild tonsillar swelling with slight erythema, no exudate; Bill members moist, lips and tongue normal  Neck: no adenopathy and supple, symmetrical, trachea midline  Lungs: clear to auscultation bilaterally and No rhonchi, rales, or wheezing  Heart: regular rate and rhythm, S1, S2 normal, no murmur, click, rub or gallop     Lab Results    Recent Results (from the past 24 hour(s))   Rapid Strep A Screen-Throat swab   Result Value Ref Range    Rapid Strep A Antigen No Group A Strep detected, presumptive negative No Group A Strep detected, presumptive negative     I personally reviewed these results and discussed findings with the patient.

## 2021-07-03 NOTE — ADDENDUM NOTE
Addendum Note by Teddy Melo PA-C at 2/21/2019 10:25 AM     Author: Teddy Melo PA-C Service: -- Author Type: Physician Assistant    Filed: 2/22/2019  5:33 PM Encounter Date: 2/21/2019 Status: Signed    : Teddy Melo PA-C (Physician Assistant)    Addended by: TEDDY MELO on: 2/22/2019 05:33 PM        Modules accepted: Orders

## 2021-08-15 ENCOUNTER — HEALTH MAINTENANCE LETTER (OUTPATIENT)
Age: 21
End: 2021-08-15

## 2021-10-11 ENCOUNTER — HEALTH MAINTENANCE LETTER (OUTPATIENT)
Age: 21
End: 2021-10-11

## 2022-09-24 ENCOUNTER — HEALTH MAINTENANCE LETTER (OUTPATIENT)
Age: 22
End: 2022-09-24

## 2023-10-14 ENCOUNTER — HEALTH MAINTENANCE LETTER (OUTPATIENT)
Age: 23
End: 2023-10-14